# Patient Record
Sex: FEMALE | Race: WHITE | NOT HISPANIC OR LATINO | Employment: STUDENT | URBAN - METROPOLITAN AREA
[De-identification: names, ages, dates, MRNs, and addresses within clinical notes are randomized per-mention and may not be internally consistent; named-entity substitution may affect disease eponyms.]

---
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
## 2019-08-15 NOTE — PROGRESS NOTES
Assessment/Plan:     Req given for labs-will call results or release to my chart  Will start tri sprintec daily as directed after completing labs  RTO over [place] break for pill check  Follow with Dr Compa Fitch for acne treatment; skin care discussed      Diagnoses and all orders for this visit:    Irregular menses  -     norgestimate-ethinyl estradiol (ORTHO TRI-CYCLEN,TRINESSA) 0 18/0 215/0 25 MG-35 MCG per tablet; Take 1 tablet by mouth daily  -     17-Hydroxyprogesterone; Future  -     DHEA-sulfate; Future  -     Prolactin; Future  -     Testosterone, free, total; Future  -     TSH, 3rd generation; Future  -     T4, free; Future  -     Follicle stimulating hormone; Future    Acne vulgaris  -     norgestimate-ethinyl estradiol (ORTHO TRI-CYCLEN,TRINESSA) 0 18/0 215/0 25 MG-35 MCG per tablet; Take 1 tablet by mouth daily              Subjective:      Patient ID: Neelam Sherwood is a 20 y o  female  Neelam Sherwood is a 20 y o  female who is here today as a new patient for a problem visit  Accompanied by her mother who works with [employer]  Admits to an irregular menses since onset  Menarche 14  Started on MOLINA (tri sprintec)for acne treatment which did regulate her menses  Discontinued MOLINA 9/18 as acne resolved  3 menses x 5 days with mod flow since she stopped her MOLINA  Last menses 3/19  Exercises 3 times per weeks  Neelam Sherwood has never been sexually active  Attends [employer] and will return 9/14/19  Gene year and studying gen [employer] and likely [employer] statistics  Acne has returned on her face and upper back  Not currently using any treatment  Follows with Dr Compa Fitch next week  Concerned over amenorrhea  The following portions of the patient's history were reviewed and updated as appropriate: allergies, current medications, past family history, past medical history, past social history, past surgical history and problem list     Review of Systems   Constitutional: Negative    Negative for activity change, appetite change, chills, diaphoresis, fatigue, fever and unexpected weight change  HENT: Negative for congestion, dental problem, sneezing, sore throat and trouble swallowing  Eyes: Negative for visual disturbance  Respiratory: Negative for chest tightness and shortness of breath  Cardiovascular: Negative for chest pain and leg swelling  Gastrointestinal: Negative for abdominal pain, constipation, diarrhea, nausea and vomiting  Genitourinary: Positive for menstrual problem  Negative for difficulty urinating, dyspareunia, dysuria, frequency, hematuria, pelvic pain, urgency, vaginal bleeding, vaginal discharge and vaginal pain  Musculoskeletal: Negative for back pain and neck pain  Skin: Negative  acne   Allergic/Immunologic: Negative  Neurological: Negative for weakness and headaches  Hematological: Negative for adenopathy  Psychiatric/Behavioral: Negative  Objective:      /66 (BP Location: Left arm, Patient Position: Sitting, Cuff Size: Standard)   Pulse 70   Ht 5' 6" (1 676 m)   Wt 67 kg (147 lb 12 8 oz)   LMP  (Approximate) Comment: 03/2019  BMI 23 86 kg/m²          Physical Exam   Constitutional: She is oriented to person, place, and time  She appears well-developed and well-nourished  HENT:   Head: Normocephalic  Neck: Normal range of motion  Neck supple  No tracheal deviation present  No thyromegaly present  Cardiovascular: Normal rate, regular rhythm, normal heart sounds and intact distal pulses  Pulmonary/Chest: Effort normal and breath sounds normal  Right breast exhibits no inverted nipple, no mass, no nipple discharge, no skin change and no tenderness  Left breast exhibits no inverted nipple, no mass, no nipple discharge, no skin change and no tenderness  No breast swelling, tenderness, discharge or bleeding  Breasts are symmetrical    Abdominal: Soft  She exhibits no distension and no mass  There is no tenderness  There is no guarding  Lymphadenopathy:     She has no cervical adenopathy  Neurological: She is alert and oriented to person, place, and time  Skin: Skin is warm and dry  Psychiatric: She has a normal mood and affect  Nursing note and vitals reviewed

## 2019-08-16 ENCOUNTER — OFFICE VISIT (OUTPATIENT)
Dept: GYNECOLOGY | Facility: CLINIC | Age: 19
End: 2019-08-16
Payer: COMMERCIAL

## 2019-08-16 VITALS
SYSTOLIC BLOOD PRESSURE: 108 MMHG | HEART RATE: 70 BPM | HEIGHT: 66 IN | BODY MASS INDEX: 23.75 KG/M2 | DIASTOLIC BLOOD PRESSURE: 66 MMHG | WEIGHT: 147.8 LBS

## 2019-08-16 DIAGNOSIS — L70.0 ACNE VULGARIS: ICD-10-CM

## 2019-08-16 DIAGNOSIS — N92.6 IRREGULAR MENSES: Primary | ICD-10-CM

## 2019-08-16 PROCEDURE — 99203 OFFICE O/P NEW LOW 30 MIN: CPT | Performed by: NURSE PRACTITIONER

## 2019-08-16 RX ORDER — NORGESTIMATE AND ETHINYL ESTRADIOL 7DAYSX3 28
1 KIT ORAL DAILY
Qty: 84 TABLET | Refills: 0 | Status: SHIPPED | OUTPATIENT
Start: 2019-08-16 | End: 2019-12-13 | Stop reason: SDUPTHER

## 2019-08-16 NOTE — PATIENT INSTRUCTIONS
Req given for labs-will call results or release to my chart  Will start tri sprintec daily as directed after completing labs  RTO over Anthony break for pill check  Follow with Dr Sunday Ames for acne treatment; skin care discussed

## 2019-08-18 ENCOUNTER — LAB (OUTPATIENT)
Dept: LAB | Age: 19
End: 2019-08-18
Payer: COMMERCIAL

## 2019-08-18 DIAGNOSIS — N92.6 IRREGULAR MENSES: ICD-10-CM

## 2019-08-18 LAB
FSH SERPL-ACNC: 6.9 MIU/ML
PROLACTIN SERPL-MCNC: 21.7 NG/ML
T4 FREE SERPL-MCNC: 0.86 NG/DL (ref 0.78–1.33)
TSH SERPL DL<=0.05 MIU/L-ACNC: 2.35 UIU/ML (ref 0.46–3.98)

## 2019-08-18 PROCEDURE — 83498 ASY HYDROXYPROGESTERONE 17-D: CPT

## 2019-08-18 PROCEDURE — 82627 DEHYDROEPIANDROSTERONE: CPT

## 2019-08-18 PROCEDURE — 84439 ASSAY OF FREE THYROXINE: CPT

## 2019-08-18 PROCEDURE — 83001 ASSAY OF GONADOTROPIN (FSH): CPT

## 2019-08-18 PROCEDURE — 84403 ASSAY OF TOTAL TESTOSTERONE: CPT

## 2019-08-18 PROCEDURE — 84443 ASSAY THYROID STIM HORMONE: CPT

## 2019-08-18 PROCEDURE — 36415 COLL VENOUS BLD VENIPUNCTURE: CPT

## 2019-08-18 PROCEDURE — 84146 ASSAY OF PROLACTIN: CPT

## 2019-08-18 PROCEDURE — 84402 ASSAY OF FREE TESTOSTERONE: CPT

## 2019-08-19 LAB
DHEA-S SERPL-MCNC: 182.3 UG/DL (ref 110–431.7)
TESTOST FREE SERPL-MCNC: 2.6 PG/ML (ref 0–4.2)
TESTOST SERPL-MCNC: 63 NG/DL (ref 8–48)

## 2019-08-21 LAB — 17OHP SERPL-MCNC: 105 NG/DL

## 2019-08-21 NOTE — RESULT ENCOUNTER NOTE
Labs normal except for a mildly elevated level of Testosterone  The Free Testosterone which is the active form of the hormone is normal  Please see Tracie over your [place] break as planned

## 2019-09-05 ENCOUNTER — OFFICE VISIT (OUTPATIENT)
Dept: FAMILY MEDICINE CLINIC | Facility: CLINIC | Age: 19
End: 2019-09-05
Payer: COMMERCIAL

## 2019-09-05 VITALS
DIASTOLIC BLOOD PRESSURE: 80 MMHG | SYSTOLIC BLOOD PRESSURE: 120 MMHG | BODY MASS INDEX: 23.48 KG/M2 | RESPIRATION RATE: 12 BRPM | OXYGEN SATURATION: 99 % | HEART RATE: 79 BPM | WEIGHT: 149.6 LBS | HEIGHT: 67 IN

## 2019-09-05 DIAGNOSIS — N92.6 IRREGULAR MENSES: ICD-10-CM

## 2019-09-05 DIAGNOSIS — Z23 ENCOUNTER FOR IMMUNIZATION: ICD-10-CM

## 2019-09-05 DIAGNOSIS — Z00.00 ANNUAL PHYSICAL EXAM: Primary | ICD-10-CM

## 2019-09-05 DIAGNOSIS — T14.8XXA BRUISING: ICD-10-CM

## 2019-09-05 PROCEDURE — 90686 IIV4 VACC NO PRSV 0.5 ML IM: CPT

## 2019-09-05 PROCEDURE — 99395 PREV VISIT EST AGE 18-39: CPT | Performed by: FAMILY MEDICINE

## 2019-09-05 PROCEDURE — 90471 IMMUNIZATION ADMIN: CPT

## 2019-09-05 NOTE — PATIENT INSTRUCTIONS

## 2019-09-05 NOTE — PROGRESS NOTES
ADULT ANNUAL PHYSICAL  [facility]    NAME: Allison Osorio  AGE: 20 y o  SEX: female  : 2000     DATE: 2019     Assessment and Plan:     Problem List Items Addressed This Visit        Other    Irregular menses    Relevant Orders    CBC    Iron    Annual physical exam - Primary     Flu shot today           Encounter for immunization    Relevant Orders    SYRINGE/SINGLE-DOSE VIAL: influenza vaccine, [id number], quadrivalent, 0 5 mL, preservative-free (AFLURIA, FLUARIX, FLULAVAL, FLUZONE)    Bruising    Relevant Orders    CBC    Iron          Immunizations and preventive care screenings were discussed with patient today  Appropriate education was printed on patient's after visit summary  Counseling:  · Dental Health: discussed importance of regular tooth brushing, flossing, and dental visits  Return in 1 year (on 2020)  Chief Complaint:     Chief Complaint   Patient presents with    Physical Exam     pt here for physical       History of Present Illness:     Adult Annual Physical   Patient here for a comprehensive physical exam  The patient reports no problems  Diet and Physical Activity  · Diet/Nutrition: well balanced diet  · Exercise: 5-7 times a week on average  Depression Screening  PHQ-9 Depression Screening    PHQ-9:    Frequency of the following problems over the past two weeks:       Little interest or pleasure in doing things:  0 - not at all  Feeling down, depressed, or hopeless:  0 - not at all  PHQ-2 Score:  0       General Health  · Sleep: sleeps well  · Hearing: normal - bilateral   · Vision: no vision problems and most recent eye exam <1 year ago  · Dental: regular dental visits and brushes teeth twice daily  /GYN Health  · Last menstrual period: irregular, now on ocp  · Contraceptive method: oral contraceptives  · History of STDs?: no      Review of Systems:     Review of Systems   Constitutional: Negative  HENT: Negative  Eyes: Negative  Respiratory: Negative  Cardiovascular: Negative  Gastrointestinal: Negative  Endocrine: Negative  Genitourinary: Negative  Musculoskeletal: Positive for arthralgias (shoulder right)  Skin: Negative  Allergic/Immunologic: Negative  Neurological: Negative  Hematological: Negative  Psychiatric/Behavioral: Negative         Past Medical History:     Past Medical History:   Diagnosis Date    Acne     Seasonal allergies       Past Surgical History:     Past Surgical History:   Procedure Laterality Date    EAR SURGERY      eustachian tube    WISDOM TOOTH EXTRACTION        Social History:     Social History     Socioeconomic History    Marital status: Single     Spouse name: None    Number of children: None    Years of education: None    Highest education level: None   Occupational History    None   Social Needs    Financial resource strain: None    Food insecurity:     Worry: None     Inability: None    Transportation needs:     Medical: None     Non-medical: None   Tobacco Use    Smoking status: Never Smoker    Smokeless tobacco: Never Used   Substance and Sexual Activity    Alcohol use: Yes     Comment: Occasional    Drug use: No    Sexual activity: Never   Lifestyle    Physical activity:     Days per week: None     Minutes per session: None    Stress: None   Relationships    Social connections:     Talks on phone: None     Gets together: None     Attends Mu-ism service: None     Active member of club or organization: None     Attends meetings of clubs or organizations: None     Relationship status: None    Intimate partner violence:     Fear of current or ex partner: None     Emotionally abused: None     Physically abused: None     Forced sexual activity: None   Other Topics Concern    None   Social History Narrative    None      Family History:     Family History   Problem Relation Age of Onset    Hypertension Mother    [facility] Hyperlipidemia Mother     Diabetes Mother     Sleep apnea Mother     Hypertension Father         benign    Sleep apnea Father         obstructive-SHIREEN on CPAP    Other Father         acid reflux    Cancer Family         bladder cancer    Breast cancer Family     Lung cancer Family     Hyperlipidemia Maternal Grandmother     Cancer Maternal Grandfather         thyroid cancer    Breast cancer Paternal Grandmother     Heart disease Paternal Grandmother     Lung cancer Paternal Grandfather     Cancer Paternal Grandfather         bladder      Current Medications:     Current Outpatient Medications   Medication Sig Dispense Refill    norgestimate-ethinyl estradiol (ORTHO TRI-CYCLEN,TRINESSA) 0 18/0 215/0 25 MG-35 MCG per tablet Take 1 tablet by mouth daily 84 tablet 0     No current facility-administered medications for this visit  Allergies: Allergies   Allergen Reactions    Other      Seasonal      Physical Exam:     /80 (BP Location: Left arm, Patient Position: Sitting, Cuff Size: Standard)   Pulse 79   Resp 12   Ht 5' 6 77" (1 696 m)   Wt 67 9 kg (149 lb 9 6 oz)   SpO2 99%   BMI 23 59 kg/m²     Physical Exam   Constitutional: She is oriented to person, place, and time  Vital signs are normal  She appears well-developed and well-nourished  HENT:   Head: Normocephalic and atraumatic  Nose: Nose normal    Mouth/Throat: Oropharynx is clear and moist    Eyes: Pupils are equal, round, and reactive to light  Conjunctivae, EOM and lids are normal    Neck: Normal range of motion  Neck supple  Cardiovascular: Normal rate, regular rhythm, S1 normal, S2 normal, normal heart sounds and intact distal pulses  Pulmonary/Chest: Effort normal and breath sounds normal    Abdominal: Soft  Bowel sounds are normal    Musculoskeletal: Normal range of motion  Neurological: She is alert and oriented to person, place, and time  She has normal strength and normal reflexes     Skin: Skin is warm, dry and intact  Psychiatric: She has a normal mood and affect  Her speech is normal and behavior is normal  Judgment and thought content normal  Cognition and memory are normal    Nursing note and vitals reviewed        Yelitza Vega DO   [address] [facility]

## 2019-09-13 ENCOUNTER — APPOINTMENT (OUTPATIENT)
Dept: LAB | Age: 19
End: 2019-09-13
Payer: COMMERCIAL

## 2019-09-13 DIAGNOSIS — T14.8XXA BRUISING: ICD-10-CM

## 2019-09-13 DIAGNOSIS — N92.6 IRREGULAR MENSES: ICD-10-CM

## 2019-09-13 LAB
ERYTHROCYTE [DISTWIDTH] IN BLOOD BY AUTOMATED COUNT: 17.2 % (ref 11.6–15.1)
HCT VFR BLD AUTO: 36.8 % (ref 34.8–46.1)
HGB BLD-MCNC: 11.1 G/DL (ref 11.5–15.4)
IRON SERPL-MCNC: 62 UG/DL (ref 50–170)
MCH RBC QN AUTO: 25.5 PG (ref 26.8–34.3)
MCHC RBC AUTO-ENTMCNC: 30.2 G/DL (ref 31.4–37.4)
MCV RBC AUTO: 85 FL (ref 82–98)
PLATELET # BLD AUTO: 155 THOUSANDS/UL (ref 149–390)
PMV BLD AUTO: 10.4 FL (ref 8.9–12.7)
RBC # BLD AUTO: 4.35 MILLION/UL (ref 3.81–5.12)
WBC # BLD AUTO: 7.7 THOUSAND/UL (ref 4.31–10.16)

## 2019-09-13 PROCEDURE — 85027 COMPLETE CBC AUTOMATED: CPT

## 2019-09-13 PROCEDURE — 36415 COLL VENOUS BLD VENIPUNCTURE: CPT

## 2019-09-13 PROCEDURE — 83540 ASSAY OF IRON: CPT

## 2019-12-13 NOTE — PROGRESS NOTES
Assessment/Plan:     Take birth control as directed  ACHES reviewed  Aware of benefits, risks and alternatives of birth control  Exercise 150 minutes per week minimum  Always condom use for STI protection once sexually active  Diagnoses and all orders for this visit:    Irregular menses  -     norgestimate-ethinyl estradiol (ORTHO TRI-CYCLEN,TRINESSA) 0 18/0 215/0 25 MG-35 MCG per tablet; Take 1 tablet by mouth daily    Acne vulgaris  -     norgestimate-ethinyl estradiol (ORTHO TRI-CYCLEN,TRINESSA) 0 18/0 215/0 25 MG-35 MCG per tablet; Take 1 tablet by mouth daily              Subjective:      Patient ID: Caron Scott is a 20 y o  female  Caron Scott is a 20 y o  female who is here today for a follow up  visit  She was started on ortho tri cyclen around 8/16/19 for irregular menses  No current health concerns  She just finished her semester at [employer]  Had a good semester  Will be home for break for 3-4 weeks  Monthly menses x 4-5 days with mod flow  Menses is acceptable  Caron Scott has never been sexually active  She did follow up with Dr Devan Jimenez for acne treatment  She has not noticed a significant improvement yet  The following portions of the patient's history were reviewed and updated as appropriate: allergies, current medications, past family history, past medical history, past social history, past surgical history and problem list     Review of Systems   Constitutional: Negative  Eyes: Negative for visual disturbance  Respiratory: Negative for chest tightness and shortness of breath  Cardiovascular: Negative for chest pain, palpitations and leg swelling  Gastrointestinal: Negative for abdominal pain, constipation, diarrhea, nausea and vomiting  Genitourinary: Negative for dysuria, menstrual problem, vaginal bleeding and vaginal discharge  Skin: Negative  Neurological: Negative for weakness, light-headedness and headaches  Psychiatric/Behavioral: Negative      All other systems reviewed and are negative  Objective:      /70 (BP Location: Left arm, Patient Position: Sitting, Cuff Size: Standard)   Ht 5' 6 7" (1 694 m)   Wt 70 3 kg (155 lb)   LMP 11/19/2019   BMI 24 50 kg/m²          Physical Exam   Constitutional: She is oriented to person, place, and time  She appears well-developed and well-nourished  Neurological: She is alert and oriented to person, place, and time  Skin: Skin is warm and dry  Psychiatric: She has a normal mood and affect  Nursing note and vitals reviewed

## 2019-12-16 ENCOUNTER — OFFICE VISIT (OUTPATIENT)
Dept: GYNECOLOGY | Facility: CLINIC | Age: 19
End: 2019-12-16
Payer: COMMERCIAL

## 2019-12-16 VITALS
HEIGHT: 67 IN | WEIGHT: 155 LBS | DIASTOLIC BLOOD PRESSURE: 70 MMHG | SYSTOLIC BLOOD PRESSURE: 118 MMHG | BODY MASS INDEX: 24.33 KG/M2

## 2019-12-16 DIAGNOSIS — L70.0 ACNE VULGARIS: ICD-10-CM

## 2019-12-16 DIAGNOSIS — N92.6 IRREGULAR MENSES: ICD-10-CM

## 2019-12-16 PROCEDURE — 99213 OFFICE O/P EST LOW 20 MIN: CPT | Performed by: NURSE PRACTITIONER

## 2019-12-16 RX ORDER — NORGESTIMATE AND ETHINYL ESTRADIOL 7DAYSX3 28
1 KIT ORAL DAILY
Qty: 84 TABLET | Refills: 2 | Status: SHIPPED | OUTPATIENT
Start: 2019-12-16 | End: 2020-08-26 | Stop reason: SDUPTHER

## 2019-12-16 NOTE — PATIENT INSTRUCTIONS
Take birth control as directed  ACHES reviewed  Aware of benefits, risks and alternatives of birth control  Exercise 150 minutes per week minimum  Always condom use for STI protection once sexually active

## 2020-08-25 NOTE — PROGRESS NOTES
Assessment/Plan:  NGE  Acne/ irregular cycles- OTC  BCM- Virgin         Pap smear q 3yrs  p initiation of sexual activity  Cervical Cultures till 24                 "                                                           RTO 1 yr  SBA monthly                                                                              Exercise 3/ wk                                                                                        Calcium 1,000 mg/d with Vit D                    Testosterone, Free  0 0 - 4 2 pg/mL  2 6    TESTOSTERONE TOTAL  8 - 48 ng/dL  63High         Depression Screen: Neg                                     Diagnoses and all orders for this visit:    Encounter for annual routine gynecological examination    Irregular menses    Acne vulgaris    Surveillance of contraceptive pill              Subjective:        Patient ID: Ada Crow is a 23 y o  female  Katerin Brooklyn returns for a yearly evaluation  She is without complaints  The oral contraceptives are regulating her cycles and have had a mild benefit on her complexion  She initially was prescribed oral contraceptives by a dermatologist, Dr Lou Mendoza, for acne  I was several years ago  When it was discontinued her menses were irregular  They were also irregular since menarche but she attributed that to athletics  Last year she had lab work performed and a testosterone level was mildly elevated  The free testosterone, however, was normal   She is a virgin  She will be leaving shortly 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 at [employer]  Classes do not start until October but she signed at [facility] [employer] which begins September 1st       The following portions of the patient's history were reviewed and updated as appropriate: She  has a past medical history of Acne and Seasonal allergies    Patient Active Problem List    Diagnosis Date Noted    Annual physical exam 09/05/2019    Encounter for immunization 09/05/2019    Bruising 09/05/2019    Irregular menses 08/16/2019    Well adult exam 09/04/2018    Body mass index, pediatric, 5th percentile to less than 85th percentile for age 09/04/2018    Acne 04/07/2016   PMH:  Saskia Seymour Menses from beginning  Acne- OC's by Juan Stanley off OC's '19, nl labs with mild elevation of T, but nl free T- restarted OCs  Anemia 12/19  She  has a past surgical history that includes EAR SURGERY and [place] tooth extraction  Her family history includes Breast cancer in her family and paternal grandmother; Cancer in her family, maternal grandfather, and paternal grandfather; Diabetes in her mother; Heart disease in her paternal grandmother; Hyperlipidemia in her maternal grandmother and mother; Hypertension in her father and mother; Lung cancer in her family and paternal grandfather; Other in her father; Sleep apnea in her father and mother  She  reports that she has never smoked  She has never used smokeless tobacco  She reports current alcohol use  She reports that she does not use drugs  Current Outpatient Medications   Medication Sig Dispense Refill    norgestimate-ethinyl estradiol (ORTHO TRI-CYCLEN,TRINESSA) 0 18/0 215/0 25 MG-35 MCG per tablet Take 1 tablet by mouth daily 84 tablet 2     No current facility-administered medications for this visit  Current Outpatient Medications on File Prior to Visit   Medication Sig    norgestimate-ethinyl estradiol (ORTHO TRI-CYCLEN,TRINESSA) 0 18/0 215/0 25 MG-35 MCG per tablet Take 1 tablet by mouth daily     No current facility-administered medications on file prior to visit  She is allergic to other       Review of Systems   Constitutional: Negative for activity change, appetite change, chills, fatigue, fever and unexpected weight change  HENT: Negative for congestion, rhinorrhea, sinus pressure, sore throat and trouble swallowing      Eyes: Negative for discharge, redness, itching and visual disturbance  Respiratory: Negative for cough, chest tightness, shortness of breath and wheezing  Cardiovascular: Negative for chest pain, palpitations and leg swelling  Gastrointestinal: Negative for abdominal distention, abdominal pain, blood in stool, constipation, diarrhea, nausea and vomiting  Genitourinary: Negative for decreased urine volume, difficulty urinating, dyspareunia, dysuria, frequency, hematuria, menstrual problem, pelvic pain, urgency, vaginal bleeding, vaginal discharge and vaginal pain  Musculoskeletal: Negative for arthralgias  Skin: Negative for rash  Neurological: Negative for weakness, light-headedness, numbness and headaches  Hematological: Does not bruise/bleed easily  Psychiatric/Behavioral: Negative for agitation, behavioral problems and sleep disturbance  The patient is not nervous/anxious  Objective:    /60 (BP Location: Left arm, Patient Position: Sitting, Cuff Size: Standard)   Temp 99 6 °F (37 6 °C)   Ht 5' 6" (1 676 m)   Wt 72 4 kg (159 lb 9 6 oz)   LMP 08/02/2020   BMI 25 76 kg/m²        Physical Exam  Vitals signs and nursing note reviewed  Exam conducted with a chaperone present  Constitutional:       General: She is not in acute distress  Appearance: Normal appearance  She is normal weight  HENT:      Head: Normocephalic and atraumatic  Eyes:      General: No scleral icterus  Right eye: No discharge  Left eye: No discharge  Extraocular Movements: Extraocular movements intact  Neck:      Musculoskeletal: Normal range of motion and neck supple  No neck rigidity or muscular tenderness  Cardiovascular:      Rate and Rhythm: Normal rate and regular rhythm  Heart sounds: Normal heart sounds  No murmur  Pulmonary:      Effort: Pulmonary effort is normal  No respiratory distress  Breath sounds: Normal breath sounds  No wheezing  Abdominal:      General: Abdomen is flat  There is no distension  Palpations: Abdomen is soft  There is no mass  Tenderness: There is no abdominal tenderness  There is no right CVA tenderness, left CVA tenderness, guarding or rebound  Genitourinary:     General: Normal vulva  Comments: External genitalia normal    Normal hair pattern  Musculoskeletal: Normal range of motion  General: No swelling  Right lower leg: No edema  Left lower leg: No edema  Lymphadenopathy:      Cervical: No cervical adenopathy  Skin:     General: Skin is warm and dry  Comments: Normal hair pattern  No Hirsutism  Neurological:      Mental Status: She is alert and oriented to person, place, and time  Psychiatric:         Mood and Affect: Mood normal          Behavior: Behavior normal          Thought Content:  Thought content normal          Judgment: Judgment normal

## 2020-08-26 ENCOUNTER — TELEPHONE (OUTPATIENT)
Dept: GYNECOLOGY | Facility: CLINIC | Age: 20
End: 2020-08-26

## 2020-08-26 ENCOUNTER — ANNUAL EXAM (OUTPATIENT)
Dept: GYNECOLOGY | Facility: CLINIC | Age: 20
End: 2020-08-26
Payer: COMMERCIAL

## 2020-08-26 VITALS
DIASTOLIC BLOOD PRESSURE: 60 MMHG | SYSTOLIC BLOOD PRESSURE: 100 MMHG | TEMPERATURE: 99.6 F | WEIGHT: 159.6 LBS | BODY MASS INDEX: 25.65 KG/M2 | HEIGHT: 66 IN

## 2020-08-26 DIAGNOSIS — Z01.419 ENCOUNTER FOR ANNUAL ROUTINE GYNECOLOGICAL EXAMINATION: Primary | ICD-10-CM

## 2020-08-26 DIAGNOSIS — N92.6 IRREGULAR MENSES: ICD-10-CM

## 2020-08-26 DIAGNOSIS — Z30.41 SURVEILLANCE OF CONTRACEPTIVE PILL: ICD-10-CM

## 2020-08-26 DIAGNOSIS — L70.0 ACNE VULGARIS: ICD-10-CM

## 2020-08-26 PROCEDURE — 99395 PREV VISIT EST AGE 18-39: CPT | Performed by: OBSTETRICS & GYNECOLOGY

## 2020-08-26 RX ORDER — NORGESTIMATE AND ETHINYL ESTRADIOL 7DAYSX3 28
1 KIT ORAL DAILY
Qty: 84 TABLET | Refills: 3 | Status: SHIPPED | OUTPATIENT
Start: 2020-08-26 | End: 2021-12-23 | Stop reason: SDUPTHER

## 2020-08-26 NOTE — TELEPHONE ENCOUNTER
Pt still wants her rx to go to [facility] order  She will call the mail order pharmacy to change the address to have the script you send to her new address in [facility]

## 2020-12-22 ENCOUNTER — OFFICE VISIT (OUTPATIENT)
Dept: FAMILY MEDICINE CLINIC | Facility: CLINIC | Age: 20
End: 2020-12-22
Payer: COMMERCIAL

## 2020-12-22 VITALS
SYSTOLIC BLOOD PRESSURE: 120 MMHG | OXYGEN SATURATION: 97 % | TEMPERATURE: 96.7 F | RESPIRATION RATE: 16 BRPM | HEIGHT: 66 IN | BODY MASS INDEX: 26.07 KG/M2 | WEIGHT: 162.2 LBS | HEART RATE: 68 BPM | DIASTOLIC BLOOD PRESSURE: 78 MMHG

## 2020-12-22 DIAGNOSIS — D50.9 IRON DEFICIENCY ANEMIA, UNSPECIFIED IRON DEFICIENCY ANEMIA TYPE: ICD-10-CM

## 2020-12-22 DIAGNOSIS — Z00.00 ANNUAL PHYSICAL EXAM: Primary | ICD-10-CM

## 2020-12-22 PROBLEM — T14.8XXA BRUISING: Status: RESOLVED | Noted: 2019-09-05 | Resolved: 2020-12-22

## 2020-12-22 PROCEDURE — 99395 PREV VISIT EST AGE 18-39: CPT | Performed by: FAMILY MEDICINE

## 2020-12-23 ENCOUNTER — LAB (OUTPATIENT)
Dept: LAB | Age: 20
End: 2020-12-23
Payer: COMMERCIAL

## 2020-12-23 DIAGNOSIS — D50.9 IRON DEFICIENCY ANEMIA, UNSPECIFIED IRON DEFICIENCY ANEMIA TYPE: ICD-10-CM

## 2020-12-23 LAB
ERYTHROCYTE [DISTWIDTH] IN BLOOD BY AUTOMATED COUNT: 13.2 % (ref 11.6–15.1)
FERRITIN SERPL-MCNC: 47 NG/ML (ref 8–388)
HCT VFR BLD AUTO: 45.4 % (ref 34.8–46.1)
HGB BLD-MCNC: 14.7 G/DL (ref 11.5–15.4)
IRON SERPL-MCNC: 83 UG/DL (ref 50–170)
MCH RBC QN AUTO: 29.9 PG (ref 26.8–34.3)
MCHC RBC AUTO-ENTMCNC: 32.4 G/DL (ref 31.4–37.4)
MCV RBC AUTO: 93 FL (ref 82–98)
PLATELET # BLD AUTO: 148 THOUSANDS/UL (ref 149–390)
PMV BLD AUTO: 10.9 FL (ref 8.9–12.7)
RBC # BLD AUTO: 4.91 MILLION/UL (ref 3.81–5.12)
WBC # BLD AUTO: 5.53 THOUSAND/UL (ref 4.31–10.16)

## 2020-12-23 PROCEDURE — 85027 COMPLETE CBC AUTOMATED: CPT

## 2020-12-23 PROCEDURE — 83540 ASSAY OF IRON: CPT

## 2020-12-23 PROCEDURE — 36415 COLL VENOUS BLD VENIPUNCTURE: CPT

## 2020-12-23 PROCEDURE — 82728 ASSAY OF FERRITIN: CPT

## 2021-03-31 DIAGNOSIS — Z23 ENCOUNTER FOR IMMUNIZATION: ICD-10-CM

## 2021-04-03 ENCOUNTER — OFFICE VISIT (OUTPATIENT)
Dept: URGENT CARE | Age: 21
End: 2021-04-03
Payer: COMMERCIAL

## 2021-04-03 VITALS
HEIGHT: 66 IN | WEIGHT: 174 LBS | DIASTOLIC BLOOD PRESSURE: 59 MMHG | SYSTOLIC BLOOD PRESSURE: 126 MMHG | TEMPERATURE: 98.1 F | BODY MASS INDEX: 27.97 KG/M2 | HEART RATE: 62 BPM | OXYGEN SATURATION: 98 % | RESPIRATION RATE: 18 BRPM

## 2021-04-03 DIAGNOSIS — R23.8 SKIN PIMPLE: Primary | ICD-10-CM

## 2021-04-03 PROCEDURE — 10060 I&D ABSCESS SIMPLE/SINGLE: CPT | Performed by: PREVENTIVE MEDICINE

## 2021-04-03 PROCEDURE — G0382 LEV 3 HOSP TYPE B ED VISIT: HCPCS | Performed by: PREVENTIVE MEDICINE

## 2021-04-03 RX ORDER — CEPHALEXIN 500 MG/1
500 CAPSULE ORAL EVERY 6 HOURS SCHEDULED
Qty: 20 CAPSULE | Refills: 0 | Status: SHIPPED | OUTPATIENT
Start: 2021-04-03 | End: 2021-04-08

## 2021-04-03 NOTE — PATIENT INSTRUCTIONS
Cellulitis   AMBULATORY CARE:   Cellulitis  is a skin infection caused by bacteria  Cellulitis usually appears on the legs and feet, arms and hands, or face  Common signs and symptoms include the following:   · A red, warm, swollen area on your skin    · Pain when the area is touched    · Bumps or blisters (abscess) that may drain pus    · Bumpy, raised skin that feels like an orange peel    Call 911 if:   · You have sudden trouble breathing or chest pain  Seek care immediately if:   · Your wound gets larger and more painful  · You feel a crackling under your skin when you touch it  · You have purple dots or bumps on your skin, or you see bleeding under your skin  · You have new swelling and pain in your legs  · The red, warm, swollen area gets larger  · You see red streaks coming from the infected area  Contact your healthcare provider if:   · You have a fever  · Your fever or pain does not go away or gets worse  · The area does not get smaller after 2 days of antibiotics  · Your skin is flaking or peeling off  · You have questions or concerns about your condition or care  Treatment for cellulitis  may decrease symptoms, stop the infection from spreading, and cure the infection  Treatment depends on how severe your cellulitis is  Cellulitis may go away on its own  You may  instead need antibiotics to help treat the bacterial infection and medicines for pain  Your healthcare provider may draw a [place] around the edges of your cellulitis  If your cellulitis spreads, your healthcare provider will see it outside of the [place]  Manage your symptoms:   · Elevate the area above the level of your heart  as often as you can  This will help decrease swelling and pain  Prop the area on pillows or blankets to keep it elevated comfortably  · Clean the area daily until the wound scabs over  Gently wash the area with soap and water  Pat dry  Use dressings as directed       · Place cool or warm, wet cloths on the area as directed  Use clean cloths and clean water  Leave it on the area until the cloth is room temperature  Pat the area dry with a clean, dry cloth  The cloths may help decrease pain  Prevent cellulitis:   · Do not scratch bug bites or areas of injury  You increase your risk for cellulitis by scratching these areas  · Do not share personal items, such as towels, clothing, and razors  · Clean exercise equipment  with germ-killing  before and after you use it  · Wash your hands often  Use soap and water  Wash your hands after you use the bathroom, change a child's diapers, or sneeze  Wash your hands before you prepare or eat food  Use lotion to prevent dry, cracked skin  · Wear pressure stockings as directed  You may be told to wear the stockings if you have peripheral edema  The stockings improve blood flow and decrease swelling  · Treat athlete's foot  This can help prevent the spread of a bacterial skin infection  Follow up with your healthcare provider within 3 days, or as directed: Your healthcare provider will check if your cellulitis is getting better  You may need different medicine  Write down your questions so you remember to ask them during your visits  © Copyright [address] Information is for End User's use only and may not be sold, redistributed or otherwise used for commercial purposes  All illustrations and images included in CareNotes® are the copyrighted property of A D A M , Inc  or Laura Fang  The above information is an  only  It is not intended as medical advice for individual conditions or treatments  Talk to your doctor, nurse or pharmacist before following any medical regimen to see if it is safe and effective for you

## 2021-04-09 NOTE — PROGRESS NOTES
[address]        NAME: Stephy Louis is a 23 y o  female  : 2000    MRN: [id number]  DATE: 2021  TIME: 10:15 AM    Assessment and Plan   Skin pimple [R23 8]  1  Skin pimple  cephalexin (KEFLEX) 500 mg capsule         Patient Instructions       Follow up with PCP in 3-5 days  Proceed to  ER if symptoms worsen  Chief Complaint     Chief Complaint   Patient presents with    Mass     on left shoulder x 4 days          History of Present Illness        23years old female presents for evaluation left shoulder trap infection  She had a pimple at the shoulder, as got infected and there is the slightly cyst feeling,  Redness and tenderness to palpation  She denies previous similar painful  Review of Systems   Review of Systems   Constitutional: Negative  Respiratory: Negative  Cardiovascular: Negative  Skin: Positive for color change and rash  All other systems reviewed and are negative          Current Medications       Current Outpatient Medications:     Ferrous Sulfate (IRON PO), Take by mouth, Disp: , Rfl:     norgestimate-ethinyl estradiol (ORTHO TRI-CYCLEN,TRINESSA) 0 18/0 215/0 25 MG-35 MCG per tablet, Take 1 tablet by mouth daily, Disp: 84 tablet, Rfl: 3    Current Allergies     Allergies as of 2021 - Reviewed 2021   Allergen Reaction Noted    Other  2019            The following portions of the patient's history were reviewed and updated as appropriate: allergies, current medications, past family history, past medical history, past social history, past surgical history and problem list      Past Medical History:   Diagnosis Date    Acne     Anemia 9/15/2019    Taking an iron supplement    Seasonal allergies        Past Surgical History:   Procedure Laterality Date    EAR SURGERY      eustachian tube    WISDOM TOOTH EXTRACTION         Family History   Problem Relation Age of Onset    Hypertension Mother     Hyperlipidemia Mother    Cristal Botello Diabetes Mother         Diet controlled    Sleep apnea Mother    [de-identified] / [place] Mother     Hypertension Father         benign    Sleep apnea Father         obstructive-SHIREEN on CPAP    Other Father         acid reflux    Osteoarthritis Father         Knee    Cancer Family         bladder cancer    Breast cancer Family     Lung cancer Family     Hyperlipidemia Maternal Grandmother     Cancer Maternal Grandfather         Thyroid    Breast cancer Paternal Grandmother     Heart disease Paternal Grandmother     Lung cancer Paternal Grandfather     Cancer Paternal Grandfather         Bladder, Brain, and Lung         Medications have been verified  Objective   /59   Pulse 62   Temp 98 1 °F (36 7 °C)   Resp 18   Ht 5' 6" (1 676 m)   Wt 78 9 kg (174 lb)   LMP 03/24/2021   SpO2 98%   BMI 28 08 kg/m²        Physical Exam     Physical Exam  Vitals signs and nursing note reviewed  Constitutional:       Appearance: She is well-developed  Cardiovascular:      Rate and Rhythm: Normal rate and regular rhythm  Pulmonary:      Effort: Pulmonary effort is normal       Breath sounds: Normal breath sounds  Skin:     Findings: Abscess, erythema and rash present  Rash is nodular  Incision and drain    Date/Time: 4/9/2021 10:17 AM  Performed by: Nayeli Escobar MD  Authorized by: Nayeli Escobar MD   Universal Protocol:  Consent: Verbal consent obtained  Written consent not obtained    Consent given by: patient  Timeout called at: 4/3/2021 12:18 AM   Patient understanding: patient states understanding of the procedure being performed  Patient consent: the patient's understanding of the procedure matches consent given  Procedure consent: procedure consent matches procedure scheduled  Relevant documents: relevant documents present and verified  Test results: test results available and properly labeled  Patient identity confirmed: verbally with patient      Patient location:  Bedside  Location:     Type: Abscess    Location:  Trunk    Trunk location:  Back  Pre-procedure details:     Skin preparation:  Betadine  Anesthesia (see MAR for exact dosages): Anesthesia method:  None  Procedure details:     Complexity:  Simple    Needle aspiration: no      Incision types:  Single straight    Aspiration type: puncture aspiration      Scalpel blade:  11    Approach:  Puncture    Incision depth:  Subcutaneous    Wound management:  Extensive cleaning    Drainage:  Purulent    Drainage amount: Moderate    Wound treatment:  Wound left open    Packing materials:  None  Post-procedure details:     Patient tolerance of procedure:   Tolerated well, no immediate complications

## 2021-12-23 ENCOUNTER — OFFICE VISIT (OUTPATIENT)
Dept: FAMILY MEDICINE CLINIC | Facility: CLINIC | Age: 21
End: 2021-12-23
Payer: COMMERCIAL

## 2021-12-23 VITALS
BODY MASS INDEX: 27.19 KG/M2 | RESPIRATION RATE: 16 BRPM | DIASTOLIC BLOOD PRESSURE: 74 MMHG | TEMPERATURE: 97.4 F | OXYGEN SATURATION: 100 % | HEART RATE: 95 BPM | SYSTOLIC BLOOD PRESSURE: 120 MMHG | HEIGHT: 66 IN | WEIGHT: 169.2 LBS

## 2021-12-23 DIAGNOSIS — L70.0 ACNE VULGARIS: ICD-10-CM

## 2021-12-23 DIAGNOSIS — N92.6 IRREGULAR MENSES: ICD-10-CM

## 2021-12-23 DIAGNOSIS — Z00.00 ANNUAL PHYSICAL EXAM: Primary | ICD-10-CM

## 2021-12-23 PROCEDURE — 99395 PREV VISIT EST AGE 18-39: CPT | Performed by: FAMILY MEDICINE

## 2021-12-23 RX ORDER — NORGESTIMATE AND ETHINYL ESTRADIOL 7DAYSX3 28
1 KIT ORAL DAILY
Qty: 84 TABLET | Refills: 3 | Status: SHIPPED | OUTPATIENT
Start: 2021-12-23

## 2021-12-28 ENCOUNTER — IMMUNIZATIONS (OUTPATIENT)
Dept: FAMILY MEDICINE CLINIC | Facility: HOSPITAL | Age: 21
End: 2021-12-28

## 2021-12-28 DIAGNOSIS — Z23 ENCOUNTER FOR IMMUNIZATION: Primary | ICD-10-CM

## 2021-12-28 PROCEDURE — 0064A COVID-19 MODERNA VACC 0.25 ML BOOSTER: CPT

## 2021-12-28 PROCEDURE — 91306 COVID-19 MODERNA VACC 0.25 ML BOOSTER: CPT

## 2022-12-22 ENCOUNTER — OFFICE VISIT (OUTPATIENT)
Dept: FAMILY MEDICINE CLINIC | Facility: CLINIC | Age: 22
End: 2022-12-22

## 2022-12-22 VITALS
WEIGHT: 159.8 LBS | HEART RATE: 83 BPM | BODY MASS INDEX: 25.68 KG/M2 | TEMPERATURE: 98.3 F | OXYGEN SATURATION: 92 % | HEIGHT: 66 IN | DIASTOLIC BLOOD PRESSURE: 74 MMHG | SYSTOLIC BLOOD PRESSURE: 120 MMHG | RESPIRATION RATE: 13 BRPM

## 2022-12-22 DIAGNOSIS — Z00.00 ANNUAL PHYSICAL EXAM: Primary | ICD-10-CM

## 2022-12-22 PROBLEM — Z86.16 HISTORY OF COVID-19: Status: ACTIVE | Noted: 2022-12-22

## 2022-12-22 NOTE — PATIENT INSTRUCTIONS

## 2022-12-22 NOTE — PROGRESS NOTES
[address]    NAME: Ellen Croft  AGE: 20 y o  SEX: female  : 2000     DATE: 2022     Assessment and Plan:     Problem List Items Addressed This Visit        Other    Annual physical exam - Primary     Had covid/flu shot            Immunizations and preventive care screenings were discussed with patient today  Appropriate education was printed on patient's after visit summary  Counseling:  Dental Health: discussed importance of regular tooth brushing, flossing, and dental visits  BMI Counseling: Body mass index is 25 71 kg/m²  The BMI is above normal  Nutrition recommendations include encouraging healthy choices of fruits and vegetables  No pharmacotherapy was ordered  Rationale for BMI follow-up plan is due to patient being overweight or obese  Depression Screening and Follow-up Plan: Patient was screened for depression during today's encounter  They screened negative with a PHQ-2 score of 0  Return in 1 year (on 2023) for Annual physical      Chief Complaint:     Chief Complaint   Patient presents with   • Annual Exam     Patient here for annual wellness exam       History of Present Illness:     Adult Annual Physical   Patient here for a comprehensive physical exam  The patient reports no problems  Diet and Physical Activity  Diet/Nutrition: well balanced diet  Exercise: 3-4 times a week on average  Depression Screening  PHQ-2/9 Depression Screening    Little interest or pleasure in doing things: 0 - not at all  Feeling down, depressed, or hopeless: 0 - not at all  PHQ-2 Score: 0  PHQ-2 Interpretation: Negative depression screen       General Health  Sleep: sleeps well  Hearing: normal - bilateral   Vision: no vision problems  Dental: regular dental visits  /GYN Health  Last menstrual period: regular  Contraceptive method: oral contraceptives    History of STDs?: no  Review of Systems:     Review of Systems   Constitutional: Negative  HENT: Negative  Eyes: Negative  Respiratory: Negative  Cardiovascular: Negative  Gastrointestinal: Negative  Endocrine: Negative  Genitourinary: Negative  Musculoskeletal: Negative  Past Medical History:     Past Medical History:   Diagnosis Date   • Acne    • Anemia 9/15/2019    Taking an iron supplement   • Seasonal allergies       Past Surgical History:     Past Surgical History:   Procedure Laterality Date   • EAR SURGERY      eustachian tube   • WISDOM TOOTH EXTRACTION        Social History:     Social History     Socioeconomic History   • Marital status: Single     Spouse name: None   • Number of children: None   • Years of education: None   • Highest education level: None   Occupational History   • None   Tobacco Use   • Smoking status: Never   • Smokeless tobacco: Never   Vaping Use   • Vaping Use: Never used   Substance and Sexual Activity   • Alcohol use:  Yes     Alcohol/week: 0 0 standard drinks     Comment: social   • Drug use: No   • Sexual activity: Never   Other Topics Concern   • None   Social History Narrative   • None     Social Determinants of Health     Financial Resource Strain: Not on file   Food Insecurity: Not on file   Transportation Needs: Not on file   Physical Activity: Not on file   Stress: Not on file   Social Connections: Not on file   Intimate Partner Violence: Not on file   Housing Stability: Not on file      Family History:     Family History   Problem Relation Age of Onset   • Hypertension Mother    • Hyperlipidemia Mother    • Diabetes Mother         Diet controlled   • Sleep apnea Mother    • Miscarriages / Stillbirths Mother    • Hypertension Father         benign   • Sleep apnea Father         obstructive-SHIREEN on CPAP   • Other Father         acid reflux   • Osteoarthritis Father         Knee   • Cancer Family         bladder cancer   • Breast cancer Family    • Lung cancer Family • Hyperlipidemia Maternal Grandmother    • Cancer Maternal Grandfather         Thyroid   • Breast cancer Paternal Grandmother    • Heart disease Paternal Grandmother    • Lung cancer Paternal Grandfather    • Cancer Paternal Grandfather         Bladder, Brain, and Lung      Current Medications:     Current Outpatient Medications   Medication Sig Dispense Refill   • Ferrous Sulfate (IRON PO) Take by mouth     • norgestimate-ethinyl estradiol (ORTHO TRI-CYCLEN,TRINESSA) 0 18/0 215/0 25 MG-35 MCG per tablet Take 1 tablet by mouth daily 84 tablet 3   • VITAMIN D PO Take by mouth       No current facility-administered medications for this visit  Allergies: Allergies   Allergen Reactions   • Other      Seasonal      Physical Exam:     /74 (BP Location: Left arm, Patient Position: Sitting, Cuff Size: Standard)   Pulse 83   Temp 98 3 °F (36 8 °C) (Tympanic)   Resp 13   Ht 5' 6 1" (1 679 m)   Wt 72 5 kg (159 lb 12 8 oz)   LMP  (LMP Unknown)   SpO2 92%   BMI 25 71 kg/m²     Physical Exam  Vitals and nursing note reviewed  Constitutional:       Appearance: She is well-developed  HENT:      Head: Normocephalic and atraumatic  Right Ear: Tympanic membrane, ear canal and external ear normal       Left Ear: Tympanic membrane, ear canal and external ear normal       Nose: Nose normal    Eyes:      Extraocular Movements: Extraocular movements intact  Conjunctiva/sclera: Conjunctivae normal       Pupils: Pupils are equal, round, and reactive to light  Cardiovascular:      Rate and Rhythm: Normal rate and regular rhythm  Heart sounds: Normal heart sounds  Pulmonary:      Effort: Pulmonary effort is normal       Breath sounds: Normal breath sounds  Abdominal:      General: Abdomen is flat  Bowel sounds are normal       Palpations: Abdomen is soft  Musculoskeletal:         General: Normal range of motion  Cervical back: Normal range of motion and neck supple     Skin:     General: Skin is warm and dry  Capillary Refill: Capillary refill takes less than 2 seconds  Neurological:      General: No focal deficit present  Mental Status: She is alert and oriented to person, place, and time  Psychiatric:         Mood and Affect: Mood normal          Behavior: Behavior normal          Thought Content:  Thought content normal          Judgment: Judgment normal           Marie Celeste DO   [address] [facility]

## 2023-12-26 ENCOUNTER — OFFICE VISIT (OUTPATIENT)
Dept: OBGYN CLINIC | Facility: CLINIC | Age: 23
End: 2023-12-26
Payer: COMMERCIAL

## 2023-12-26 VITALS
WEIGHT: 164.2 LBS | SYSTOLIC BLOOD PRESSURE: 138 MMHG | DIASTOLIC BLOOD PRESSURE: 80 MMHG | BODY MASS INDEX: 27.36 KG/M2 | HEIGHT: 65 IN

## 2023-12-26 DIAGNOSIS — N92.6 IRREGULAR MENSES: ICD-10-CM

## 2023-12-26 DIAGNOSIS — L70.0 ACNE VULGARIS: ICD-10-CM

## 2023-12-26 DIAGNOSIS — Z12.4 SCREENING FOR CERVICAL CANCER: ICD-10-CM

## 2023-12-26 DIAGNOSIS — Z30.41 SURVEILLANCE FOR BIRTH CONTROL, ORAL CONTRACEPTIVES: ICD-10-CM

## 2023-12-26 DIAGNOSIS — Z01.419 ENCOUNTER FOR ANNUAL ROUTINE GYNECOLOGICAL EXAMINATION: Primary | ICD-10-CM

## 2023-12-26 PROCEDURE — S0610 ANNUAL GYNECOLOGICAL EXAMINA: HCPCS | Performed by: OBSTETRICS & GYNECOLOGY

## 2023-12-26 PROCEDURE — G0145 SCR C/V CYTO,THINLAYER,RESCR: HCPCS | Performed by: OBSTETRICS & GYNECOLOGY

## 2023-12-26 RX ORDER — NORGESTIMATE AND ETHINYL ESTRADIOL 7DAYSX3 28
1 KIT ORAL DAILY
Qty: 84 TABLET | Refills: 3 | Status: SHIPPED | OUTPATIENT
Start: 2023-12-26

## 2023-12-26 NOTE — PROGRESS NOTES
Assessment/Plan:  NGE  Acne/ irregular cycles- OTC  BCM- OTC                                                     Pap smear q 3yrs, '26  Cervical Cultures till 24     declined the                                                           RTO 1 yr.                                                                                    SBA monthly                                                                              Exercise 3/ wk                                                                                        Calcium 1,000 mg/d with Vit D                     Testosterone, Free  0.0 - 4.2 pg/mL  2.6    TESTOSTERONE TOTAL  8 - 48 ng/dL  63High           Depression Screen: Neg                                     Diagnoses and all orders for this visit:    Encounter for annual routine gynecological examination  -     Liquid-based pap, screening    Screening for cervical cancer    Surveillance for birth control, oral contraceptives    Irregular menses  -     norgestimate-ethinyl estradiol (ORTHO TRI-CYCLEN,TRINESSA) 0.18/0.215/0.25 MG-35 MCG per tablet; Take 1 tablet by mouth daily    Acne vulgaris  -     norgestimate-ethinyl estradiol (ORTHO TRI-CYCLEN,TRINESSA) 0.18/0.215/0.25 MG-35 MCG per tablet; Take 1 tablet by mouth daily              Subjective:        Patient ID: Jasmina Mehta is a 23 y.o. female.    Jasmina returns for an annual visit.  I have not seen her in 3 years.  She graduated from [employer] in 2021.  She was  in October of this year to [place].  They still live in [place].  She is in town visiting for the holidays.  She has remained on oral contraceptives since I last saw her.  This was for irregular menses and acne.  Now it is also for contraception.  Her health has remained the same.  They do not desire to start a family for at least 2 years.    At this point her only concern is some lumpiness to the right lateral breast that she recently palpated.  It is nontender nor painful.  Her last  menstrual period was December 17.  She is currently in the first week of a new pill pack.    Her  is her first and only sexual partner.  He had 1 other before her.  Before they engaged in intercourse he was screened negative.  She declines any cervical cultures today.      We discussed when to discontinue oral contraceptives in preparation for conception. Prenatal vitamins and neural tube defects were explained.  The fact that she had irregular menses in the past may increase her risk of infertility. If that does recur she should seek help after only 6 months of conception attempts.             The following portions of the patient's history were reviewed and updated as appropriate: She  has a past medical history of Acne, Anemia (9/15/2019), and Seasonal allergies.  Patient Active Problem List    Diagnosis Date Noted    Encounter for annual routine gynecological examination 12/26/2023    History of COVID-19 12/22/2022    Iron deficiency anemia 12/22/2020    Annual physical exam 09/05/2019    Encounter for immunization 09/05/2019    Irregular menses 08/16/2019    Body mass index, pediatric, 5th percentile to less than 85th percentile for age 09/04/2018    Acne 04/07/2016   PMH:  [id number]  Irreg Menses from beginning  Acne- OC's by Derm  Oligomenorrhea off OC's '19, nl labs with mild elevation of T, but nl free T- restarted OCs  Anemia 12/19  Covid  She  has a past surgical history that includes EAR SURGERY and [place] tooth extraction.  Her family history includes Breast cancer in her family and paternal grandmother; Cancer in her family, maternal grandfather, and paternal grandfather; Diabetes in her mother; Heart disease in her paternal grandmother; Hyperlipidemia in her maternal grandmother and mother; Hypertension in her father and mother; Lung cancer in her family and paternal grandfather; Miscarriages / Stillbirths in her mother; Osteoarthritis in her father; Other in her father; Sleep apnea in her  father and mother.  FH:  M - SHIREEN, HTN, DM  F - SHIREEN, HTN  She  reports that she has never smoked. She has never used smokeless tobacco. She reports current alcohol use. She reports that she does not use drugs.  SH:  Went to [employer].  Graduated 2021, works in [place] in a  position.  to [place] 10/14/23  Current Outpatient Medications   Medication Sig Dispense Refill    Ferrous Sulfate (IRON PO) Take by mouth      norgestimate-ethinyl estradiol (ORTHO TRI-CYCLEN,TRINESSA) 0.18/0.215/0.25 MG-35 MCG per tablet Take 1 tablet by mouth daily 84 tablet 3    VITAMIN D PO Take by mouth       No current facility-administered medications for this visit.     Current Outpatient Medications on File Prior to Visit   Medication Sig    Ferrous Sulfate (IRON PO) Take by mouth    VITAMIN D PO Take by mouth    [DISCONTINUED] norgestimate-ethinyl estradiol (ORTHO TRI-CYCLEN,TRINESSA) 0.18/0.215/0.25 MG-35 MCG per tablet Take 1 tablet by mouth daily     No current facility-administered medications on file prior to visit.     She is allergic to other..    Review of Systems   Constitutional:  Negative for activity change, appetite change, fatigue and unexpected weight change.   Eyes:  Negative for visual disturbance.   Respiratory:  Negative for cough, chest tightness, shortness of breath and wheezing.    Cardiovascular:  Negative for chest pain, palpitations and leg swelling.        Breast: Patient denies tenderness, nipple discharge, masses, or erythema.   Gastrointestinal:  Negative for abdominal distention, abdominal pain, blood in stool, constipation, diarrhea, nausea and vomiting.   Endocrine: Negative for cold intolerance and heat intolerance.   Genitourinary:  Negative for decreased urine volume, difficulty urinating, dyspareunia, dysuria, frequency, hematuria, menstrual problem, pelvic pain, urgency, vaginal bleeding, vaginal discharge and vaginal pain.   Musculoskeletal:  Negative for arthralgias.   Skin:   "Negative for rash.   Neurological:  Negative for weakness, light-headedness, numbness and headaches.   Hematological:  Does not bruise/bleed easily.   Psychiatric/Behavioral:  Negative for agitation, behavioral problems and sleep disturbance. The patient is not nervous/anxious.          Objective:    Vitals:    12/26/23 1455   BP: 138/80   BP Location: Left arm   Patient Position: Sitting   Cuff Size: Standard   Weight: 74.5 kg (164 lb 3.2 oz)   Height: 5' 5\" (1.651 m)            Physical Exam  Vitals and nursing note reviewed. Exam conducted with a chaperone present.   Constitutional:       General: She is not in acute distress.     Appearance: Normal appearance. She is well-developed.   HENT:      Head: Normocephalic and atraumatic.   Eyes:      General: No scleral icterus.        Right eye: No discharge.         Left eye: No discharge.      Extraocular Movements: Extraocular movements intact.      Conjunctiva/sclera: Conjunctivae normal.   Neck:      Thyroid: No thyromegaly.      Trachea: No tracheal deviation.   Cardiovascular:      Rate and Rhythm: Normal rate and regular rhythm.      Heart sounds: Normal heart sounds. No murmur heard.  Pulmonary:      Effort: Pulmonary effort is normal. No respiratory distress.      Breath sounds: Normal breath sounds. No wheezing.   Chest:   Breasts:     Breasts are symmetrical.      Right: No inverted nipple, mass, nipple discharge, skin change or tenderness.      Left: No inverted nipple, mass, nipple discharge, skin change or tenderness.      Comments: Fibrocystic changes bilaterally.  No dominant mass.  The area of concern is normal.  Abdominal:      General: Bowel sounds are normal. There is no distension.      Palpations: Abdomen is soft. There is no mass.      Tenderness: There is no abdominal tenderness. There is no guarding or rebound.   Genitourinary:     General: Normal vulva.      Labia:         Right: No rash, tenderness or lesion.         Left: No rash, " tenderness or lesion.       Vagina: Normal.      Cervix: No cervical motion tenderness or discharge.      Uterus: Not deviated, not enlarged and not tender.       Adnexa:         Right: No mass, tenderness or fullness.          Left: No mass, tenderness or fullness.        Rectum: No external hemorrhoid.      Comments: Urethral meatus within normal limits.  Perineum within normal limits.  Bladder well supported.  Musculoskeletal:         General: No tenderness. Normal range of motion.      Cervical back: Normal range of motion and neck supple.   Lymphadenopathy:      Cervical: No cervical adenopathy.      Upper Body:      Right upper body: No axillary adenopathy.   Skin:     General: Skin is warm and dry.   Neurological:      Mental Status: She is alert and oriented to person, place, and time.   Psychiatric:         Mood and Affect: Mood normal.         Behavior: Behavior normal.         Thought Content: Thought content normal.         Judgment: Judgment normal.

## 2024-01-02 LAB
LAB AP GYN PRIMARY INTERPRETATION: NORMAL
Lab: NORMAL